# Patient Record
Sex: FEMALE | Race: ASIAN | NOT HISPANIC OR LATINO | URBAN - METROPOLITAN AREA
[De-identification: names, ages, dates, MRNs, and addresses within clinical notes are randomized per-mention and may not be internally consistent; named-entity substitution may affect disease eponyms.]

---

## 2022-12-08 ENCOUNTER — INPATIENT (INPATIENT)
Facility: HOSPITAL | Age: 32
LOS: 1 days | Discharge: HOME | End: 2022-12-10
Attending: OBSTETRICS & GYNECOLOGY | Admitting: OBSTETRICS & GYNECOLOGY
Payer: COMMERCIAL

## 2022-12-08 VITALS — HEART RATE: 64 BPM | SYSTOLIC BLOOD PRESSURE: 107 MMHG | TEMPERATURE: 99 F | DIASTOLIC BLOOD PRESSURE: 66 MMHG

## 2022-12-08 DIAGNOSIS — O26.899 OTHER SPECIFIED PREGNANCY RELATED CONDITIONS, UNSPECIFIED TRIMESTER: ICD-10-CM

## 2022-12-08 DIAGNOSIS — Z34.80 ENCOUNTER FOR SUPERVISION OF OTHER NORMAL PREGNANCY, UNSPECIFIED TRIMESTER: ICD-10-CM

## 2022-12-08 DIAGNOSIS — Z3A.00 WEEKS OF GESTATION OF PREGNANCY NOT SPECIFIED: ICD-10-CM

## 2022-12-08 LAB
BASOPHILS # BLD AUTO: 0.04 K/UL — SIGNIFICANT CHANGE UP (ref 0–0.2)
BASOPHILS NFR BLD AUTO: 0.3 % — SIGNIFICANT CHANGE UP (ref 0–2)
BLD GP AB SCN SERPL QL: POSITIVE — SIGNIFICANT CHANGE UP
EOSINOPHIL # BLD AUTO: 0.03 K/UL — SIGNIFICANT CHANGE UP (ref 0–0.5)
EOSINOPHIL NFR BLD AUTO: 0.2 % — SIGNIFICANT CHANGE UP (ref 0–6)
GLUCOSE BLDC GLUCOMTR-MCNC: 107 MG/DL — HIGH (ref 70–99)
GLUCOSE BLDC GLUCOMTR-MCNC: 137 MG/DL — HIGH (ref 70–99)
GLUCOSE BLDC GLUCOMTR-MCNC: 90 MG/DL — SIGNIFICANT CHANGE UP (ref 70–99)
HCT VFR BLD CALC: 39.9 % — SIGNIFICANT CHANGE UP (ref 34.5–45)
HGB BLD-MCNC: 13.5 G/DL — SIGNIFICANT CHANGE UP (ref 11.5–15.5)
IMM GRANULOCYTES NFR BLD AUTO: 0.8 % — SIGNIFICANT CHANGE UP (ref 0–0.9)
LYMPHOCYTES # BLD AUTO: 14.9 % — SIGNIFICANT CHANGE UP (ref 13–44)
LYMPHOCYTES # BLD AUTO: 2.05 K/UL — SIGNIFICANT CHANGE UP (ref 1–3.3)
MCHC RBC-ENTMCNC: 32.1 PG — SIGNIFICANT CHANGE UP (ref 27–34)
MCHC RBC-ENTMCNC: 33.8 GM/DL — SIGNIFICANT CHANGE UP (ref 32–36)
MCV RBC AUTO: 95 FL — SIGNIFICANT CHANGE UP (ref 80–100)
MONOCYTES # BLD AUTO: 0.8 K/UL — SIGNIFICANT CHANGE UP (ref 0–0.9)
MONOCYTES NFR BLD AUTO: 5.8 % — SIGNIFICANT CHANGE UP (ref 2–14)
NEUTROPHILS # BLD AUTO: 10.77 K/UL — HIGH (ref 1.8–7.4)
NEUTROPHILS NFR BLD AUTO: 78 % — HIGH (ref 43–77)
NRBC # BLD: 0 /100 WBCS — SIGNIFICANT CHANGE UP (ref 0–0)
PLATELET # BLD AUTO: 150 K/UL — SIGNIFICANT CHANGE UP (ref 150–400)
RBC # BLD: 4.2 M/UL — SIGNIFICANT CHANGE UP (ref 3.8–5.2)
RBC # FLD: 13.2 % — SIGNIFICANT CHANGE UP (ref 10.3–14.5)
RH IG SCN BLD-IMP: NEGATIVE — SIGNIFICANT CHANGE UP
RH IG SCN BLD-IMP: NEGATIVE — SIGNIFICANT CHANGE UP
SARS-COV-2 RNA SPEC QL NAA+PROBE: SIGNIFICANT CHANGE UP
WBC # BLD: 13.8 K/UL — HIGH (ref 3.8–10.5)
WBC # FLD AUTO: 13.8 K/UL — HIGH (ref 3.8–10.5)

## 2022-12-08 PROCEDURE — 86077 PHYS BLOOD BANK SERV XMATCH: CPT

## 2022-12-08 RX ORDER — KETOROLAC TROMETHAMINE 30 MG/ML
30 SYRINGE (ML) INJECTION ONCE
Refills: 0 | Status: DISCONTINUED | OUTPATIENT
Start: 2022-12-08 | End: 2022-12-08

## 2022-12-08 RX ORDER — TETANUS TOXOID, REDUCED DIPHTHERIA TOXOID AND ACELLULAR PERTUSSIS VACCINE, ADSORBED 5; 2.5; 8; 8; 2.5 [IU]/.5ML; [IU]/.5ML; UG/.5ML; UG/.5ML; UG/.5ML
0.5 SUSPENSION INTRAMUSCULAR ONCE
Refills: 0 | Status: DISCONTINUED | OUTPATIENT
Start: 2022-12-08 | End: 2022-12-10

## 2022-12-08 RX ORDER — SODIUM CHLORIDE 9 MG/ML
3 INJECTION INTRAMUSCULAR; INTRAVENOUS; SUBCUTANEOUS EVERY 8 HOURS
Refills: 0 | Status: DISCONTINUED | OUTPATIENT
Start: 2022-12-08 | End: 2022-12-10

## 2022-12-08 RX ORDER — SIMETHICONE 80 MG/1
80 TABLET, CHEWABLE ORAL EVERY 4 HOURS
Refills: 0 | Status: DISCONTINUED | OUTPATIENT
Start: 2022-12-08 | End: 2022-12-10

## 2022-12-08 RX ORDER — OXYTOCIN 10 UNIT/ML
4 VIAL (ML) INJECTION
Qty: 30 | Refills: 0 | Status: DISCONTINUED | OUTPATIENT
Start: 2022-12-08 | End: 2022-12-08

## 2022-12-08 RX ORDER — HYDROCORTISONE 1 %
1 OINTMENT (GRAM) TOPICAL EVERY 6 HOURS
Refills: 0 | Status: DISCONTINUED | OUTPATIENT
Start: 2022-12-08 | End: 2022-12-10

## 2022-12-08 RX ORDER — OXYTOCIN 10 UNIT/ML
333.33 VIAL (ML) INJECTION
Qty: 20 | Refills: 0 | Status: DISCONTINUED | OUTPATIENT
Start: 2022-12-08 | End: 2022-12-08

## 2022-12-08 RX ORDER — LANOLIN
1 OINTMENT (GRAM) TOPICAL EVERY 6 HOURS
Refills: 0 | Status: DISCONTINUED | OUTPATIENT
Start: 2022-12-08 | End: 2022-12-10

## 2022-12-08 RX ORDER — OXYCODONE HYDROCHLORIDE 5 MG/1
5 TABLET ORAL ONCE
Refills: 0 | Status: DISCONTINUED | OUTPATIENT
Start: 2022-12-08 | End: 2022-12-10

## 2022-12-08 RX ORDER — OXYTOCIN 10 UNIT/ML
41.67 VIAL (ML) INJECTION
Qty: 20 | Refills: 0 | Status: DISCONTINUED | OUTPATIENT
Start: 2022-12-08 | End: 2022-12-10

## 2022-12-08 RX ORDER — SODIUM CHLORIDE 9 MG/ML
1000 INJECTION, SOLUTION INTRAVENOUS
Refills: 0 | Status: DISCONTINUED | OUTPATIENT
Start: 2022-12-08 | End: 2022-12-08

## 2022-12-08 RX ORDER — SODIUM CHLORIDE 9 MG/ML
1000 INJECTION INTRAMUSCULAR; INTRAVENOUS; SUBCUTANEOUS
Refills: 0 | Status: DISCONTINUED | OUTPATIENT
Start: 2022-12-08 | End: 2022-12-08

## 2022-12-08 RX ORDER — IBUPROFEN 200 MG
600 TABLET ORAL EVERY 6 HOURS
Refills: 0 | Status: COMPLETED | OUTPATIENT
Start: 2022-12-08 | End: 2023-11-06

## 2022-12-08 RX ORDER — BENZOCAINE 10 %
1 GEL (GRAM) MUCOUS MEMBRANE EVERY 6 HOURS
Refills: 0 | Status: DISCONTINUED | OUTPATIENT
Start: 2022-12-08 | End: 2022-12-10

## 2022-12-08 RX ORDER — MAGNESIUM HYDROXIDE 400 MG/1
30 TABLET, CHEWABLE ORAL
Refills: 0 | Status: DISCONTINUED | OUTPATIENT
Start: 2022-12-08 | End: 2022-12-10

## 2022-12-08 RX ORDER — PRAMOXINE HYDROCHLORIDE 150 MG/15G
1 AEROSOL, FOAM RECTAL EVERY 4 HOURS
Refills: 0 | Status: DISCONTINUED | OUTPATIENT
Start: 2022-12-08 | End: 2022-12-10

## 2022-12-08 RX ORDER — CITRIC ACID/SODIUM CITRATE 300-500 MG
15 SOLUTION, ORAL ORAL EVERY 6 HOURS
Refills: 0 | Status: DISCONTINUED | OUTPATIENT
Start: 2022-12-08 | End: 2022-12-08

## 2022-12-08 RX ORDER — OXYCODONE HYDROCHLORIDE 5 MG/1
5 TABLET ORAL
Refills: 0 | Status: DISCONTINUED | OUTPATIENT
Start: 2022-12-08 | End: 2022-12-10

## 2022-12-08 RX ORDER — ACETAMINOPHEN 500 MG
975 TABLET ORAL
Refills: 0 | Status: DISCONTINUED | OUTPATIENT
Start: 2022-12-08 | End: 2022-12-10

## 2022-12-08 RX ORDER — AER TRAVELER 0.5 G/1
1 SOLUTION RECTAL; TOPICAL EVERY 4 HOURS
Refills: 0 | Status: DISCONTINUED | OUTPATIENT
Start: 2022-12-08 | End: 2022-12-10

## 2022-12-08 RX ORDER — CHLORHEXIDINE GLUCONATE 213 G/1000ML
1 SOLUTION TOPICAL ONCE
Refills: 0 | Status: DISCONTINUED | OUTPATIENT
Start: 2022-12-08 | End: 2022-12-08

## 2022-12-08 RX ORDER — DIPHENHYDRAMINE HCL 50 MG
25 CAPSULE ORAL EVERY 6 HOURS
Refills: 0 | Status: DISCONTINUED | OUTPATIENT
Start: 2022-12-08 | End: 2022-12-10

## 2022-12-08 RX ORDER — DIBUCAINE 1 %
1 OINTMENT (GRAM) RECTAL EVERY 6 HOURS
Refills: 0 | Status: DISCONTINUED | OUTPATIENT
Start: 2022-12-08 | End: 2022-12-10

## 2022-12-08 RX ADMIN — Medication 4 MILLIUNIT(S)/MIN: at 17:43

## 2022-12-08 RX ADMIN — SODIUM CHLORIDE 125 MILLILITER(S): 9 INJECTION, SOLUTION INTRAVENOUS at 17:17

## 2022-12-08 RX ADMIN — Medication 30 MILLIGRAM(S): at 21:27

## 2022-12-08 RX ADMIN — SODIUM CHLORIDE 125 MILLILITER(S): 9 INJECTION INTRAMUSCULAR; INTRAVENOUS; SUBCUTANEOUS at 16:44

## 2022-12-08 NOTE — OB RN TRIAGE NOTE - FALL HARM RISK - UNIVERSAL INTERVENTIONS
Bed in lowest position, wheels locked, appropriate side rails in place/Call bell, personal items and telephone in reach/Instruct patient to call for assistance before getting out of bed or chair/Non-slip footwear when patient is out of bed/[place] to call system/Physically safe environment - no spills, clutter or unnecessary equipment/Purposeful Proactive Rounding/Room/bathroom lighting operational, light cord in reach

## 2022-12-08 NOTE — OB RN DELIVERY SUMMARY - NSSELHIDDEN_OBGYN_ALL_OB_FT
[NS_DeliveryAttending1_OBGYN_ALL_OB_FT:[id number]],[NS_DeliveryRN_OBGYN_ALL_OB_FT:[id number]=]

## 2022-12-08 NOTE — OB RN PATIENT PROFILE - FUNCTIONAL ASSESSMENT - BASIC MOBILITY 6.
4-calculated by average/Not able to assess (calculate score using [facility] averaging method)

## 2022-12-08 NOTE — OB PROVIDER H&P - HISTORY OF PRESENT ILLNESS
R1 H&P    Pt is a 33y/o  at 38w2d admitted for labor at term.  Prenatal course: uncomplicated. pt of Dr. Rubio  GBS: -  EFW: 3175    OBHx:  GynHx: denies history of STI, denies history of abnormal pap smears, denies history of fibroids/cysts  PMHx: denies history of asthma, anxiety/depression, bleeding/clotting disorder, hypertension, diabetes.  PSHx: denies prior abdominal or uterine surgery.  Med: PNV  All:  SH: Patient lives at home w/ ***. Patient feels safe at home and in all her relationships. Denies use of tobacco/alcohol/drugs prior to or during pregnancy.    VS:Vital Signs Last 24 Hrs  T(C): 37.0 (08 Dec 2022 12:54), Max: 37.0 (08 Dec 2022 12:54)  T(F): 98.6 (08 Dec 2022 12:54), Max: 98.6 (08 Dec 2022 12:54)  HR: 78 (08 Dec 2022 12:57) (64 - 78)  BP: 107/66 (08 Dec 2022 12:54) (107/66 - 107/66)  BP(mean): --  RR: --  SpO2: 94% (08 Dec 2022 12:57) (94% - 94%)      GA: NAD  Cards: RRR  Pulm: CTAB  EFH:  [place]:  VE:  TAUS:    A&P:   Labor: admit to L&D  -PO cytotec  -routine labs  -EFM/toco  -CLD, IV hydration  Fetal: cat 1 tracing, fetal status reassuring  GBS: neg  Analgesia:       d/w Dr. Joanne Rushing PGY1 R1 H&P    Pt is a 33y/o  w gDMA1 at 38w2d admitted for labor at term.  Prenatal course: gDMA1. pt of Dr. Rubio  GBS: -  EFW: 3175    OBHx:   GynHx: denies history of STI, denies history of abnormal pap smears, denies history of fibroids/cysts  PMHx: denies history of asthma, anxiety/depression, bleeding/clotting disorder, hypertension, diabetes.  PSHx: denies prior abdominal or uterine surgery.  Med: PNV  All: NKDA  SH: Patient lives at home w/ her  and dog. Patient feels safe at home and in all her relationships. Denies use of tobacco/alcohol/drugs prior to or during pregnancy.    ICU Vital Signs Last 24 Hrs  T(C): 37.0 (08 Dec 2022 12:54), Max: 37.0 (08 Dec 2022 12:54)  T(F): 98.6 (08 Dec 2022 12:54), Max: 98.6 (08 Dec 2022 12:54)  HR: 78 (08 Dec 2022 12:57) (64 - 78)  BP: 107/66 (08 Dec 2022 12:54) (107/66 - 107/66)  BP(mean): --  ABP: --  ABP(mean): --  RR: --  SpO2: 94% (08 Dec 2022 12:57) (94% - 94%)      GA: NAD  Cards: RRR  Pulm: CTAB  EFH: baseline 145, moderate variability, +accels, -decels  [place]: q7min  VE: 5/80/-2  TAUS: vertex

## 2022-12-08 NOTE — OB RN DELIVERY SUMMARY - NS_SEPSISRSKCALC_OBGYN_ALL_OB_FT
EOS calculated successfully. EOS Risk Factor: 0.5/1000 live births ([facility] national incidence); GA=38w2d; Temp=98.78; ROM=0.583; GBS='Negative'; Antibiotics='No antibiotics or any antibiotics < 2 hrs prior to birth'

## 2022-12-08 NOTE — OB PROVIDER H&P - ASSESSMENT
A&P: Pt is a 33y/o  w gDMA1 at 38w2d admitted for labor at term.  Labor: admit to L&D  - expectant management  -routine labs  -EFM/toco  -CLD, IV hydration  Fetal: cat 1 tracing, fetal status reassuring  GBS: neg  Analgesia: epidural PRN      d/w Dr. Joanne Rushing PGY1

## 2022-12-08 NOTE — OB PROVIDER LABOR PROGRESS NOTE - ASSESSMENT
A/P 32y P @wks IOL for GDMA1  -IOL: c/w pit, patient still intact  -Cat 1 tracing  -GBS neg  -EFW 3175  -GDMA1 - NPO, c/w rotating fluids, FS q4h  -COVID neg  -Analgesia epi  -Anticipate , will begin pushing once Dr. Rubio arrives and patient moved to a labor room    d/w Dr. Joanne Mullins, PGY-1

## 2022-12-08 NOTE — OB PROVIDER LABOR PROGRESS NOTE - NS_SUBJECTIVE/OBJECTIVE_OBGYN_ALL_OB_FT
R1 Labor Note    S: Patient evaluated at bedside for cervical change.     O:  T(C): 37.1 (12-08-22 @ 18:32), Max: 37.1 (12-08-22 @ 18:32)  HR: 67 (12-08-22 @ 18:32) (64 - 88)  BP: 116/69 (12-08-22 @ 18:32) (99/58 - 136/57)  SpO2: 94% (12-08-22 @ 17:09) (94% - 94%)

## 2022-12-08 NOTE — OB PROVIDER H&P - NSLOWPPHRISK_OBGYN_A_OB
No previous uterine incision/[place] Pregnancy/Less than or equal to 4 previous vaginal births/No known bleeding disorder/No history of postpartum hemorrhage/No other PPH risks indicated

## 2022-12-08 NOTE — OB PROVIDER TRIAGE NOTE - HISTORY OF PRESENT ILLNESS
Pt is a 31y/o  at 38w2d seen in triage for rule out labor  Prenatal course: complicated by gDMA1. pt of Dr. Rubio  GBS: -  EFW: 3175    OBHx:   GynHx: denies history of STI, denies history of abnormal pap smears, denies history of fibroids/cysts  PMHx: denies history of asthma, anxiety/depression, bleeding/clotting disorder, hypertension, diabetes.  PSHx: denies prior abdominal or uterine surgery.  Med: PNV  All: NKDA  SH: Patient lives at home w/ her . Patient feels safe at home and in all her relationships. Denies use of tobacco/alcohol/drugs prior to or during pregnancy.    VS:Vital Signs Last 24 Hrs  T(C): 37.0 (08 Dec 2022 12:54), Max: 37.0 (08 Dec 2022 12:54)  T(F): 98.6 (08 Dec 2022 12:54), Max: 98.6 (08 Dec 2022 12:54)  HR: 78 (08 Dec 2022 12:57) (64 - 78)  BP: 107/66 (08 Dec 2022 12:54) (107/66 - 107/66)  BP(mean): --  RR: --  SpO2: 94% (08 Dec 2022 12:57) (94% - 94%)      GA: NAD  Cards: RRR  Pulm: CTAB  EFH: baseline 145, moderate variability, -accels, -decels  [place]: q9min  VE: 4/80/-3  TAUS: vertex

## 2022-12-09 LAB
COVID-19 SPIKE DOMAIN AB INTERP: POSITIVE
COVID-19 SPIKE DOMAIN ANTIBODY RESULT: >250 U/ML — HIGH
KLEIHAUER-BETKE CALCULATION: 0 % — SIGNIFICANT CHANGE UP (ref 0–0.3)
SARS-COV-2 IGG+IGM SERPL QL IA: >250 U/ML — HIGH
SARS-COV-2 IGG+IGM SERPL QL IA: POSITIVE
T PALLIDUM AB TITR SER: NEGATIVE — SIGNIFICANT CHANGE UP

## 2022-12-09 RX ORDER — IBUPROFEN 200 MG
600 TABLET ORAL EVERY 6 HOURS
Refills: 0 | Status: DISCONTINUED | OUTPATIENT
Start: 2022-12-09 | End: 2022-12-10

## 2022-12-09 RX ADMIN — SODIUM CHLORIDE 3 MILLILITER(S): 9 INJECTION INTRAMUSCULAR; INTRAVENOUS; SUBCUTANEOUS at 06:00

## 2022-12-09 RX ADMIN — Medication 600 MILLIGRAM(S): at 23:39

## 2022-12-09 RX ADMIN — Medication 975 MILLIGRAM(S): at 09:31

## 2022-12-09 RX ADMIN — Medication 975 MILLIGRAM(S): at 16:49

## 2022-12-09 RX ADMIN — Medication 600 MILLIGRAM(S): at 07:26

## 2022-12-09 RX ADMIN — Medication 600 MILLIGRAM(S): at 17:54

## 2022-12-09 RX ADMIN — Medication 975 MILLIGRAM(S): at 20:46

## 2022-12-09 RX ADMIN — Medication 975 MILLIGRAM(S): at 21:20

## 2022-12-09 RX ADMIN — Medication 975 MILLIGRAM(S): at 03:45

## 2022-12-09 RX ADMIN — Medication 600 MILLIGRAM(S): at 12:50

## 2022-12-09 RX ADMIN — Medication 600 MILLIGRAM(S): at 18:50

## 2022-12-09 RX ADMIN — Medication 975 MILLIGRAM(S): at 15:49

## 2022-12-09 RX ADMIN — Medication 1 TABLET(S): at 11:49

## 2022-12-09 RX ADMIN — Medication 600 MILLIGRAM(S): at 11:50

## 2022-12-09 RX ADMIN — Medication 975 MILLIGRAM(S): at 08:31

## 2022-12-09 RX ADMIN — Medication 600 MILLIGRAM(S): at 06:26

## 2022-12-09 NOTE — PROGRESS NOTE ADULT - ASSESSMENT
A/P:  32y  PPD # 1   S/P     Doing Well    PMHx: GDM  Current Issues:  1) Mom Rh Neg, Baby Rh (+). For Rhogam 1 vial IM x 1. KB pending      yes

## 2022-12-09 NOTE — PROGRESS NOTE ADULT - SUBJECTIVE AND OBJECTIVE BOX
PA Postpartum Note- PPD#1    Prenatal Labs  Blood type: A Negative  Rubella IgG: IMMune  RPR: Negative        S:Patient w/o complaints, pain is controlled.    Pt is OOB, tolerating PO, voiding. Lochia WNL.     O:  Vital Signs Last 24 Hrs  T(C): 36.6 (09 Dec 2022 05:02), Max: 37.1 (08 Dec 2022 18:32)  T(F): 97.9 (09 Dec 2022 05:02), Max: 98.78 (08 Dec 2022 18:32)  HR: 67 (09 Dec 2022 05:02) (64 - 93)  BP: 106/72 (09 Dec 2022 05:02) (94/56 - 136/57)  BP(mean): 79 (08 Dec 2022 23:50) (69 - 79)  RR: 17 (09 Dec 2022 05:02) (17 - 18)  SpO2: 94% (09 Dec 2022 05:02) (92% - 100%)    Parameters below as of 09 Dec 2022 05:02  Patient On (Oxygen Delivery Method): room air         Gen: NAD  Abdomen: Soft, nontender, non-distended, fundus firm.  Vaginal: Lochia WNL,    Ext: Neg edema, Neg calf tenderness    LABS:    Hemoglobin: 13.5 g/dL (12-08 @ 17:58)      Hematocrit: 39.9 % (12-08 @ 17:58)

## 2022-12-10 VITALS
DIASTOLIC BLOOD PRESSURE: 68 MMHG | SYSTOLIC BLOOD PRESSURE: 104 MMHG | OXYGEN SATURATION: 98 % | TEMPERATURE: 98 F | HEART RATE: 82 BPM | RESPIRATION RATE: 17 BRPM

## 2022-12-10 PROCEDURE — 59050 FETAL MONITOR W/REPORT: CPT

## 2022-12-10 PROCEDURE — 59025 FETAL NON-STRESS TEST: CPT

## 2022-12-10 PROCEDURE — 86870 RBC ANTIBODY IDENTIFICATION: CPT

## 2022-12-10 PROCEDURE — 36415 COLL VENOUS BLD VENIPUNCTURE: CPT

## 2022-12-10 PROCEDURE — 86769 SARS-COV-2 COVID-19 ANTIBODY: CPT

## 2022-12-10 PROCEDURE — 86901 BLOOD TYPING SEROLOGIC RH(D): CPT

## 2022-12-10 PROCEDURE — U0003: CPT

## 2022-12-10 PROCEDURE — 85025 COMPLETE CBC W/AUTO DIFF WBC: CPT

## 2022-12-10 PROCEDURE — 85460 HEMOGLOBIN FETAL: CPT

## 2022-12-10 PROCEDURE — 86780 TREPONEMA PALLIDUM: CPT

## 2022-12-10 PROCEDURE — 86850 RBC ANTIBODY SCREEN: CPT

## 2022-12-10 PROCEDURE — U0005: CPT

## 2022-12-10 PROCEDURE — 82962 GLUCOSE BLOOD TEST: CPT

## 2022-12-10 PROCEDURE — 86900 BLOOD TYPING SEROLOGIC ABO: CPT

## 2022-12-10 RX ORDER — ACETAMINOPHEN 500 MG
3 TABLET ORAL
Qty: 0 | Refills: 0 | DISCHARGE
Start: 2022-12-10

## 2022-12-10 RX ORDER — IBUPROFEN 200 MG
1 TABLET ORAL
Qty: 0 | Refills: 0 | DISCHARGE
Start: 2022-12-10

## 2022-12-10 RX ADMIN — Medication 975 MILLIGRAM(S): at 09:08

## 2022-12-10 RX ADMIN — Medication 975 MILLIGRAM(S): at 04:00

## 2022-12-10 RX ADMIN — Medication 975 MILLIGRAM(S): at 10:08

## 2022-12-10 RX ADMIN — Medication 975 MILLIGRAM(S): at 03:24

## 2022-12-10 RX ADMIN — Medication 600 MILLIGRAM(S): at 00:15

## 2022-12-10 NOTE — DISCHARGE NOTE OB - CARE PLAN
Principal Discharge DX:	Vaginal delivery  Assessment and plan of treatment:	pelvic rest, limit activity, regular diet   1

## 2022-12-10 NOTE — DISCHARGE NOTE OB - PATIENT PORTAL LINK FT
You can access the FollowMyHealth Patient Portal offered by [facility] by registering at the following website: [email or web address]. By joining [employer]’s FollowMyHealth portal, you will also be able to view your health information using other applications (apps) compatible with our system.

## 2022-12-10 NOTE — DISCHARGE NOTE OB - FOUL SMELLING VAGINAL DISCHARGE
How Severe Are Your Spot(S)?: moderate Have Your Spot(S) Been Treated In The Past?: has not been treated Hpi Title: Evaluation of Skin Lesions Statement Selected

## 2022-12-10 NOTE — CHART NOTE - NSCHARTNOTEFT_GEN_A_CORE
Patient seen for: nutrition consult for GDM postpartum education prior to discharge    Source: patient, EMR    Patient is a 31yo female   Admission date: 12/8  Antepartum course significant for GDMA1.  Pt plans on breastfeeding infant    Chart reviewed, events noted. Meds/Labs reviewed.    Anthropometrics:  Height: 64 inches  Pre-pregnancy weight: 125pounds   Weight gain: 29 pounds   Admit/Dosing wt: 154.9 pounds     Nutrition Diagnosis:   Food and Nutrition Related Knowledge Deficit related to knowledge of post-partum GDM nutrition recommendations as evidenced by lack of previous exposure to education.       Goal: Pt able to teach back 2 points of nutrition education provided.     Nutrition Intervention:  Post-partum GDM diet education provided to patient at bedside:   1) Increased risk of developing T2DM with GDM and ways to help prevent development  2) 4-12 week fasting oral glucose tolerance test follow-up as outpatient  3) General healthful diet and physical activity recommendations discussed  4) Increased energy needs with breastfeeding    After-delivery GDM education handout provided.      Monitoring:  No other nutrition risks were identified during this encounter.  RD remains available upon request and will follow up per protocol.    Brittney Win RDN CDN (Pager #[phone])

## 2022-12-10 NOTE — DISCHARGE NOTE OB - MEDICATION SUMMARY - MEDICATIONS TO TAKE
I will START or STAY ON the medications listed below when I get home from the hospital:    acetaminophen 325 mg oral tablet  -- 3 tab(s) by mouth 3 to 4 times a day  -- Indication: For  (normal spontaneous vaginal delivery)    ibuprofen 600 mg oral tablet  -- 1 tab(s) by mouth every 6 hours  -- Indication: For  (normal spontaneous vaginal delivery)    Prenatal Multivitamins with Folic Acid 1 mg oral tablet  -- 1 tab(s) by mouth once a day  -- Indication: For  (normal spontaneous vaginal delivery)

## 2022-12-10 NOTE — DISCHARGE NOTE OB - HOSPITAL COURSE
see hospital record for detailed account  pt ambulating, elizabeth regular diet and ready for dc home

## 2022-12-10 NOTE — DISCHARGE NOTE OB - NS MD DC FALL RISK RISK
For information on Fall & Injury Prevention, visit: [email or web address] OR  [email or web address] OR  https://www.cdc.gov/steadi/patient.html

## 2023-10-17 NOTE — DISCHARGE NOTE OB - NSDCCPGOAL_GEN_ALL_CORE_FT
Ramy rincon 10/17/23 - has S8 machine that quit working. Is establishing with [facility] and replacement machine was ordered.    Download 1-3 months on new machine to check pressures, AHI, etc   return to prenatal state

## 2024-06-20 NOTE — OB PROVIDER H&P - NSPREVIOUSLIVEBIR_OBGYN_ALL_OB
Hpi Title: Evaluation of Skin Lesions
How Severe Are Your Spot(S)?: mild
Have Your Spot(S) Been Treated In The Past?: has not been treated
Additional History: Has a new rough lesion on the scalp and a possible tag in the right axilla.
No

## 2024-10-11 PROBLEM — O24.419 GESTATIONAL DIABETES MELLITUS IN PREGNANCY, UNSPECIFIED CONTROL: Chronic | Status: ACTIVE | Noted: 2022-12-08

## 2024-10-17 ENCOUNTER — APPOINTMENT (OUTPATIENT)
Age: 34
End: 2024-10-17

## 2024-10-17 DIAGNOSIS — Z23 ENCOUNTER FOR IMMUNIZATION: ICD-10-CM

## 2024-10-17 PROBLEM — Z00.00 ENCOUNTER FOR PREVENTIVE HEALTH EXAMINATION: Status: ACTIVE | Noted: 2024-10-17

## 2025-01-03 ENCOUNTER — TRANSCRIPTION ENCOUNTER (OUTPATIENT)
Age: 35
End: 2025-01-03

## 2025-01-03 ENCOUNTER — APPOINTMENT (OUTPATIENT)
Age: 35
End: 2025-01-03

## 2025-01-03 DIAGNOSIS — Z23 ENCOUNTER FOR IMMUNIZATION: ICD-10-CM

## 2025-02-26 ENCOUNTER — NON-APPOINTMENT (OUTPATIENT)
Age: 35
End: 2025-02-26

## 2025-02-28 ENCOUNTER — APPOINTMENT (OUTPATIENT)
Dept: FAMILY MEDICINE | Facility: CLINIC | Age: 35
End: 2025-02-28
Payer: COMMERCIAL

## 2025-02-28 VITALS
RESPIRATION RATE: 16 BRPM | DIASTOLIC BLOOD PRESSURE: 70 MMHG | OXYGEN SATURATION: 97 % | SYSTOLIC BLOOD PRESSURE: 92 MMHG | HEIGHT: 64 IN | BODY MASS INDEX: 22.71 KG/M2 | HEART RATE: 69 BPM | WEIGHT: 133 LBS | TEMPERATURE: 97.3 F

## 2025-02-28 DIAGNOSIS — Z13.1 ENCOUNTER FOR SCREENING FOR DIABETES MELLITUS: ICD-10-CM

## 2025-02-28 DIAGNOSIS — Z13.29 ENCOUNTER FOR SCREENING FOR OTHER SUSPECTED ENDOCRINE DISORDER: ICD-10-CM

## 2025-02-28 DIAGNOSIS — Z12.39 ENCOUNTER FOR OTHER SCREENING FOR MALIGNANT NEOPLASM OF BREAST: ICD-10-CM

## 2025-02-28 DIAGNOSIS — Z78.9 OTHER SPECIFIED HEALTH STATUS: ICD-10-CM

## 2025-02-28 DIAGNOSIS — Z00.00 ENCOUNTER FOR GENERAL ADULT MEDICAL EXAMINATION W/OUT ABNORMAL FINDINGS: ICD-10-CM

## 2025-02-28 DIAGNOSIS — N92.0 EXCESSIVE AND FREQUENT MENSTRUATION WITH REGULAR CYCLE: ICD-10-CM

## 2025-02-28 PROCEDURE — 99385 PREV VISIT NEW AGE 18-39: CPT

## 2025-02-28 PROCEDURE — 96127 BRIEF EMOTIONAL/BEHAV ASSMT: CPT

## 2025-02-28 RX ORDER — MULTIVIT-MIN/FOLIC/VIT K/LYCOP 400-300MCG
TABLET ORAL
Refills: 0 | Status: ACTIVE | COMMUNITY

## 2025-03-01 ENCOUNTER — LABORATORY RESULT (OUTPATIENT)
Age: 35
End: 2025-03-01

## 2025-03-03 DIAGNOSIS — E78.5 HYPERLIPIDEMIA, UNSPECIFIED: ICD-10-CM

## 2025-03-03 DIAGNOSIS — R31.29 OTHER MICROSCOPIC HEMATURIA: ICD-10-CM

## 2025-03-03 LAB
ALBUMIN SERPL ELPH-MCNC: 4.5 G/DL
ALP BLD-CCNC: 45 U/L
ALT SERPL-CCNC: 9 U/L
ANION GAP SERPL CALC-SCNC: 13 MMOL/L
APPEARANCE: CLEAR
AST SERPL-CCNC: 15 U/L
BASOPHILS # BLD AUTO: 0.03 K/UL
BASOPHILS NFR BLD AUTO: 0.5 %
BILIRUB SERPL-MCNC: 0.4 MG/DL
BILIRUBIN URINE: NEGATIVE
BLOOD URINE: ABNORMAL
BUN SERPL-MCNC: 9 MG/DL
CALCIUM SERPL-MCNC: 9.5 MG/DL
CHLORIDE SERPL-SCNC: 103 MMOL/L
CHOLEST SERPL-MCNC: 177 MG/DL
CO2 SERPL-SCNC: 25 MMOL/L
COLOR: YELLOW
CREAT SERPL-MCNC: 0.68 MG/DL
EGFR: 116 ML/MIN/1.73M2
EOSINOPHIL # BLD AUTO: 0.24 K/UL
EOSINOPHIL NFR BLD AUTO: 3.8 %
ESTIMATED AVERAGE GLUCOSE: 108 MG/DL
FERRITIN SERPL-MCNC: 74 NG/ML
GLUCOSE QUALITATIVE U: NEGATIVE MG/DL
GLUCOSE SERPL-MCNC: 86 MG/DL
HBA1C MFR BLD HPLC: 5.4 %
HBV SURFACE AB SER QL: REACTIVE
HCT VFR BLD CALC: 41.8 %
HDLC SERPL-MCNC: 48 MG/DL
HGB BLD-MCNC: 13.5 G/DL
IMM GRANULOCYTES NFR BLD AUTO: 0.3 %
IRON SATN MFR SERPL: 22 %
IRON SERPL-MCNC: 60 UG/DL
KETONES URINE: NEGATIVE MG/DL
LDLC SERPL CALC-MCNC: 108 MG/DL
LEUKOCYTE ESTERASE URINE: NEGATIVE
LYMPHOCYTES # BLD AUTO: 2.8 K/UL
LYMPHOCYTES NFR BLD AUTO: 43.9 %
MAN DIFF?: NORMAL
MCHC RBC-ENTMCNC: 29.5 PG
MCHC RBC-ENTMCNC: 32.3 G/DL
MCV RBC AUTO: 91.5 FL
MEV IGG FLD QL IA: 36.9 AU/ML
MEV IGG+IGM SER-IMP: POSITIVE
MONOCYTES # BLD AUTO: 0.3 K/UL
MONOCYTES NFR BLD AUTO: 4.7 %
MUV AB SER-ACNC: POSITIVE
MUV IGG SER QL IA: >300 AU/ML
NEUTROPHILS # BLD AUTO: 2.99 K/UL
NEUTROPHILS NFR BLD AUTO: 46.8 %
NITRITE URINE: NEGATIVE
NONHDLC SERPL-MCNC: 129 MG/DL
PH URINE: 7.5
PLATELET # BLD AUTO: 239 K/UL
POTASSIUM SERPL-SCNC: 4.3 MMOL/L
PROT SERPL-MCNC: 7.1 G/DL
PROTEIN URINE: NEGATIVE MG/DL
RBC # BLD: 4.57 M/UL
RBC # FLD: 12.9 %
RUBV IGG FLD-ACNC: 16.9 INDEX
RUBV IGG SER-IMP: POSITIVE
SODIUM SERPL-SCNC: 141 MMOL/L
SPECIFIC GRAVITY URINE: 1.02
TIBC SERPL-MCNC: 279 UG/DL
TRIGL SERPL-MCNC: 118 MG/DL
TSH SERPL-ACNC: 1.99 UIU/ML
UIBC SERPL-MCNC: 219 UG/DL
UROBILINOGEN URINE: 0.2 MG/DL
VZV AB TITR SER: POSITIVE
VZV IGG SER IF-ACNC: 4.96 S/CO
WBC # FLD AUTO: 6.38 K/UL

## 2025-03-04 ENCOUNTER — TRANSCRIPTION ENCOUNTER (OUTPATIENT)
Age: 35
End: 2025-03-04

## 2025-03-04 DIAGNOSIS — Z23 ENCOUNTER FOR IMMUNIZATION: ICD-10-CM

## 2025-04-05 ENCOUNTER — APPOINTMENT (OUTPATIENT)
Dept: MAMMOGRAPHY | Facility: CLINIC | Age: 35
End: 2025-04-05
Payer: COMMERCIAL

## 2025-04-05 ENCOUNTER — OUTPATIENT (OUTPATIENT)
Dept: OUTPATIENT SERVICES | Facility: HOSPITAL | Age: 35
LOS: 1 days | End: 2025-04-05
Payer: COMMERCIAL

## 2025-04-05 ENCOUNTER — APPOINTMENT (OUTPATIENT)
Dept: ULTRASOUND IMAGING | Facility: CLINIC | Age: 35
End: 2025-04-05
Payer: COMMERCIAL

## 2025-04-05 ENCOUNTER — RESULT REVIEW (OUTPATIENT)
Age: 35
End: 2025-04-05

## 2025-04-05 DIAGNOSIS — Z12.39 ENCOUNTER FOR OTHER SCREENING FOR MALIGNANT NEOPLASM OF BREAST: ICD-10-CM

## 2025-04-05 DIAGNOSIS — Z00.8 ENCOUNTER FOR OTHER GENERAL EXAMINATION: ICD-10-CM

## 2025-04-05 PROCEDURE — 77063 BREAST TOMOSYNTHESIS BI: CPT | Mod: 26

## 2025-04-05 PROCEDURE — 77063 BREAST TOMOSYNTHESIS BI: CPT

## 2025-04-05 PROCEDURE — 76641 ULTRASOUND BREAST COMPLETE: CPT | Mod: 26,50

## 2025-04-05 PROCEDURE — 77067 SCR MAMMO BI INCL CAD: CPT | Mod: 26

## 2025-04-05 PROCEDURE — 76641 ULTRASOUND BREAST COMPLETE: CPT

## 2025-04-05 PROCEDURE — 77067 SCR MAMMO BI INCL CAD: CPT

## 2025-05-01 ENCOUNTER — TRANSCRIPTION ENCOUNTER (OUTPATIENT)
Age: 35
End: 2025-05-01

## 2025-05-07 ENCOUNTER — TRANSCRIPTION ENCOUNTER (OUTPATIENT)
Age: 35
End: 2025-05-07

## 2025-06-24 NOTE — OB RN TRIAGE NOTE - NS_PRENATALCARE_OBGYN_ALL_OB
Leyla Aladinova was seen and treated in our emergency department on 6/24/2025.  She may return to work on 06/26/2025.       If you have any questions or concerns, please don't hesitate to call.      Claus Dimas MD
Yes

## 2025-08-01 DIAGNOSIS — Z80.3 FAMILY HISTORY OF MALIGNANT NEOPLASM OF BREAST: ICD-10-CM

## 2025-08-04 ENCOUNTER — TRANSCRIPTION ENCOUNTER (OUTPATIENT)
Age: 35
End: 2025-08-04